# Patient Record
Sex: MALE | URBAN - METROPOLITAN AREA
[De-identification: names, ages, dates, MRNs, and addresses within clinical notes are randomized per-mention and may not be internally consistent; named-entity substitution may affect disease eponyms.]

---

## 2024-10-15 ENCOUNTER — APPOINTMENT (OUTPATIENT)
Dept: URBAN - METROPOLITAN AREA CLINIC 214 | Age: 29
Setting detail: DERMATOLOGY
End: 2024-10-17

## 2024-10-15 DIAGNOSIS — L21.8 OTHER SEBORRHEIC DERMATITIS: ICD-10-CM

## 2024-10-15 PROCEDURE — OTHER COUNSELING: OTHER

## 2024-10-15 PROCEDURE — 99204 OFFICE O/P NEW MOD 45 MIN: CPT

## 2024-10-15 PROCEDURE — OTHER PRESCRIPTION: OTHER

## 2024-10-15 PROCEDURE — OTHER MIPS QUALITY: OTHER

## 2024-10-15 RX ORDER — FLUTICASONE PROPIONATE 0.05 MG/G
OINTMENT TOPICAL
Qty: 60 | Refills: 0 | Status: ERX | COMMUNITY
Start: 2024-10-15

## 2024-10-15 RX ORDER — KETOCONAZOLE 20 MG/ML
SHAMPOO, SUSPENSION TOPICAL
Qty: 120 | Refills: 11 | Status: ERX | COMMUNITY
Start: 2024-10-15

## 2024-10-15 NOTE — HPI: RASH
What Type Of Note Output Would You Prefer (Optional)?: Standard Output
Is This A New Presentation, Or A Follow-Up?: Rash
Additional History: This is a 29 year old male who is a new patient who is being seen for a chief complaint of skin lesions, located on the anterior scalp. The lesions are bleeding and itchy, mild in severity and have been present for years. These lesions have not been treated in the past. He presents today for: further evaluation and management.